# Patient Record
Sex: FEMALE | Race: WHITE | NOT HISPANIC OR LATINO | URBAN - METROPOLITAN AREA
[De-identification: names, ages, dates, MRNs, and addresses within clinical notes are randomized per-mention and may not be internally consistent; named-entity substitution may affect disease eponyms.]

---

## 2017-06-19 ENCOUNTER — INPATIENT (INPATIENT)
Facility: HOSPITAL | Age: 47
LOS: 3 days | Discharge: HOME HEALTH | End: 2017-06-23
Attending: OBSTETRICS & GYNECOLOGY | Admitting: OBSTETRICS & GYNECOLOGY
Payer: COMMERCIAL

## 2017-06-19 VITALS — WEIGHT: 129.19 LBS | HEIGHT: 63 IN

## 2017-06-19 DIAGNOSIS — O26.899 OTHER SPECIFIED PREGNANCY RELATED CONDITIONS, UNSPECIFIED TRIMESTER: ICD-10-CM

## 2017-06-19 DIAGNOSIS — Z3A.00 WEEKS OF GESTATION OF PREGNANCY NOT SPECIFIED: ICD-10-CM

## 2017-06-19 LAB
ALBUMIN SERPL ELPH-MCNC: 3.3 G/DL — SIGNIFICANT CHANGE UP (ref 3.3–5)
ALBUMIN SERPL ELPH-MCNC: 3.7 G/DL — SIGNIFICANT CHANGE UP (ref 3.3–5)
ALP SERPL-CCNC: 156 U/L — HIGH (ref 40–120)
ALP SERPL-CCNC: 169 U/L — HIGH (ref 40–120)
ALT FLD-CCNC: 34 U/L — SIGNIFICANT CHANGE UP (ref 10–45)
ALT FLD-CCNC: 40 U/L — SIGNIFICANT CHANGE UP (ref 10–45)
ANION GAP SERPL CALC-SCNC: 15 MMOL/L — SIGNIFICANT CHANGE UP (ref 5–17)
ANION GAP SERPL CALC-SCNC: 16 MMOL/L — SIGNIFICANT CHANGE UP (ref 5–17)
APPEARANCE UR: SIGNIFICANT CHANGE UP
APTT BLD: 26.6 SEC — LOW (ref 27.5–37.4)
APTT BLD: 26.8 SEC — LOW (ref 27.5–37.4)
AST SERPL-CCNC: 45 U/L — HIGH (ref 10–40)
AST SERPL-CCNC: 53 U/L — HIGH (ref 10–40)
BASOPHILS NFR BLD AUTO: 0.1 % — SIGNIFICANT CHANGE UP (ref 0–2)
BASOPHILS NFR BLD AUTO: 0.2 % — SIGNIFICANT CHANGE UP (ref 0–2)
BILIRUB SERPL-MCNC: 0.2 MG/DL — SIGNIFICANT CHANGE UP (ref 0.2–1.2)
BILIRUB SERPL-MCNC: 0.3 MG/DL — SIGNIFICANT CHANGE UP (ref 0.2–1.2)
BILIRUB UR-MCNC: NEGATIVE — SIGNIFICANT CHANGE UP
BLD GP AB SCN SERPL QL: NEGATIVE — SIGNIFICANT CHANGE UP
BUN SERPL-MCNC: 11 MG/DL — SIGNIFICANT CHANGE UP (ref 7–23)
BUN SERPL-MCNC: 16 MG/DL — SIGNIFICANT CHANGE UP (ref 7–23)
CALCIUM SERPL-MCNC: 9.5 MG/DL — SIGNIFICANT CHANGE UP (ref 8.4–10.5)
CALCIUM SERPL-MCNC: 9.7 MG/DL — SIGNIFICANT CHANGE UP (ref 8.4–10.5)
CHLORIDE SERPL-SCNC: 100 MMOL/L — SIGNIFICANT CHANGE UP (ref 96–108)
CHLORIDE SERPL-SCNC: 104 MMOL/L — SIGNIFICANT CHANGE UP (ref 96–108)
CO2 SERPL-SCNC: 19 MMOL/L — LOW (ref 22–31)
CO2 SERPL-SCNC: 22 MMOL/L — SIGNIFICANT CHANGE UP (ref 22–31)
COLOR SPEC: YELLOW — SIGNIFICANT CHANGE UP
CREAT SERPL-MCNC: 0.8 MG/DL — SIGNIFICANT CHANGE UP (ref 0.5–1.3)
CREAT SERPL-MCNC: 0.9 MG/DL — SIGNIFICANT CHANGE UP (ref 0.5–1.3)
DIFF PNL FLD: (no result)
EOSINOPHIL NFR BLD AUTO: 0.6 % — SIGNIFICANT CHANGE UP (ref 0–6)
FIBRINOGEN PPP-MCNC: 347 MG/DL — SIGNIFICANT CHANGE UP (ref 258–438)
FIBRINOGEN PPP-MCNC: 360 MG/DL — SIGNIFICANT CHANGE UP (ref 258–438)
GLUCOSE SERPL-MCNC: 81 MG/DL — SIGNIFICANT CHANGE UP (ref 70–99)
GLUCOSE SERPL-MCNC: 89 MG/DL — SIGNIFICANT CHANGE UP (ref 70–99)
GLUCOSE UR QL: NEGATIVE — SIGNIFICANT CHANGE UP
HCT VFR BLD CALC: 38.4 % — SIGNIFICANT CHANGE UP (ref 34.5–45)
HCT VFR BLD CALC: 44.5 % — SIGNIFICANT CHANGE UP (ref 34.5–45)
HGB BLD-MCNC: 13.6 G/DL — SIGNIFICANT CHANGE UP (ref 11.5–15.5)
HGB BLD-MCNC: 15.5 G/DL — SIGNIFICANT CHANGE UP (ref 11.5–15.5)
INR BLD: 0.82 — LOW (ref 0.88–1.16)
INR BLD: 0.86 — LOW (ref 0.88–1.16)
KETONES UR-MCNC: NEGATIVE — SIGNIFICANT CHANGE UP
LDH SERPL L TO P-CCNC: 279 U/L — HIGH (ref 50–242)
LDH SERPL L TO P-CCNC: 360 U/L — HIGH (ref 50–242)
LEUKOCYTE ESTERASE UR-ACNC: NEGATIVE — SIGNIFICANT CHANGE UP
LYMPHOCYTES # BLD AUTO: 29 % — SIGNIFICANT CHANGE UP (ref 13–44)
LYMPHOCYTES # BLD AUTO: 7.9 % — LOW (ref 13–44)
MCHC RBC-ENTMCNC: 30.6 PG — SIGNIFICANT CHANGE UP (ref 27–34)
MCHC RBC-ENTMCNC: 31.1 PG — SIGNIFICANT CHANGE UP (ref 27–34)
MCHC RBC-ENTMCNC: 34.8 G/DL — SIGNIFICANT CHANGE UP (ref 32–36)
MCHC RBC-ENTMCNC: 35.4 G/DL — SIGNIFICANT CHANGE UP (ref 32–36)
MCV RBC AUTO: 87.8 FL — SIGNIFICANT CHANGE UP (ref 80–100)
MCV RBC AUTO: 87.9 FL — SIGNIFICANT CHANGE UP (ref 80–100)
MONOCYTES NFR BLD AUTO: 1.6 % — LOW (ref 2–14)
MONOCYTES NFR BLD AUTO: 7.7 % — SIGNIFICANT CHANGE UP (ref 2–14)
NEUTROPHILS NFR BLD AUTO: 62.5 % — SIGNIFICANT CHANGE UP (ref 43–77)
NEUTROPHILS NFR BLD AUTO: 90.4 % — HIGH (ref 43–77)
NITRITE UR-MCNC: NEGATIVE — SIGNIFICANT CHANGE UP
PH UR: 6.5 — SIGNIFICANT CHANGE UP (ref 5–8)
PLATELET # BLD AUTO: 187 K/UL — SIGNIFICANT CHANGE UP (ref 150–400)
PLATELET # BLD AUTO: 195 K/UL — SIGNIFICANT CHANGE UP (ref 150–400)
POTASSIUM SERPL-MCNC: 4.2 MMOL/L — SIGNIFICANT CHANGE UP (ref 3.5–5.3)
POTASSIUM SERPL-MCNC: 4.8 MMOL/L — SIGNIFICANT CHANGE UP (ref 3.5–5.3)
POTASSIUM SERPL-SCNC: 4.2 MMOL/L — SIGNIFICANT CHANGE UP (ref 3.5–5.3)
POTASSIUM SERPL-SCNC: 4.8 MMOL/L — SIGNIFICANT CHANGE UP (ref 3.5–5.3)
PROT SERPL-MCNC: 6.7 G/DL — SIGNIFICANT CHANGE UP (ref 6–8.3)
PROT SERPL-MCNC: 7.4 G/DL — SIGNIFICANT CHANGE UP (ref 6–8.3)
PROT UR-MCNC: 30 MG/DL
PROTHROM AB SERPL-ACNC: 9.1 SEC — LOW (ref 9.8–12.7)
PROTHROM AB SERPL-ACNC: 9.5 SEC — LOW (ref 9.8–12.7)
RBC # BLD: 4.37 M/UL — SIGNIFICANT CHANGE UP (ref 3.8–5.2)
RBC # BLD: 5.07 M/UL — SIGNIFICANT CHANGE UP (ref 3.8–5.2)
RBC # FLD: 13.9 % — SIGNIFICANT CHANGE UP (ref 10.3–16.9)
RBC # FLD: 14 % — SIGNIFICANT CHANGE UP (ref 10.3–16.9)
RH IG SCN BLD-IMP: POSITIVE — SIGNIFICANT CHANGE UP
RH IG SCN BLD-IMP: POSITIVE — SIGNIFICANT CHANGE UP
SODIUM SERPL-SCNC: 134 MMOL/L — LOW (ref 135–145)
SODIUM SERPL-SCNC: 142 MMOL/L — SIGNIFICANT CHANGE UP (ref 135–145)
SP GR SPEC: <=1.005 — SIGNIFICANT CHANGE UP (ref 1–1.03)
T PALLIDUM AB TITR SER: NEGATIVE — SIGNIFICANT CHANGE UP
URATE SERPL-MCNC: 7 MG/DL — SIGNIFICANT CHANGE UP (ref 2.5–7)
URATE SERPL-MCNC: 7.5 MG/DL — HIGH (ref 2.5–7)
UROBILINOGEN FLD QL: 0.2 E.U./DL — SIGNIFICANT CHANGE UP
WBC # BLD: 12.4 K/UL — HIGH (ref 3.8–10.5)
WBC # BLD: 9.4 K/UL — SIGNIFICANT CHANGE UP (ref 3.8–10.5)
WBC # FLD AUTO: 12.4 K/UL — HIGH (ref 3.8–10.5)
WBC # FLD AUTO: 9.4 K/UL — SIGNIFICANT CHANGE UP (ref 3.8–10.5)

## 2017-06-19 RX ORDER — DOCUSATE SODIUM 100 MG
100 CAPSULE ORAL
Qty: 0 | Refills: 0 | Status: DISCONTINUED | OUTPATIENT
Start: 2017-06-19 | End: 2017-06-23

## 2017-06-19 RX ORDER — METOCLOPRAMIDE HCL 10 MG
10 TABLET ORAL ONCE
Qty: 0 | Refills: 0 | Status: DISCONTINUED | OUTPATIENT
Start: 2017-06-19 | End: 2017-06-19

## 2017-06-19 RX ORDER — FERROUS SULFATE 325(65) MG
325 TABLET ORAL DAILY
Qty: 0 | Refills: 0 | Status: DISCONTINUED | OUTPATIENT
Start: 2017-06-19 | End: 2017-06-23

## 2017-06-19 RX ORDER — NALOXONE HYDROCHLORIDE 4 MG/.1ML
0.1 SPRAY NASAL
Qty: 0 | Refills: 0 | Status: DISCONTINUED | OUTPATIENT
Start: 2017-06-19 | End: 2017-06-19

## 2017-06-19 RX ORDER — SODIUM CHLORIDE 9 MG/ML
1000 INJECTION, SOLUTION INTRAVENOUS
Qty: 0 | Refills: 0 | Status: DISCONTINUED | OUTPATIENT
Start: 2017-06-19 | End: 2017-06-19

## 2017-06-19 RX ORDER — TETANUS TOXOID, REDUCED DIPHTHERIA TOXOID AND ACELLULAR PERTUSSIS VACCINE, ADSORBED 5; 2.5; 8; 8; 2.5 [IU]/.5ML; [IU]/.5ML; UG/.5ML; UG/.5ML; UG/.5ML
0.5 SUSPENSION INTRAMUSCULAR ONCE
Qty: 0 | Refills: 0 | Status: DISCONTINUED | OUTPATIENT
Start: 2017-06-19 | End: 2017-06-23

## 2017-06-19 RX ORDER — HEPARIN SODIUM 5000 [USP'U]/ML
5000 INJECTION INTRAVENOUS; SUBCUTANEOUS EVERY 12 HOURS
Qty: 0 | Refills: 0 | Status: DISCONTINUED | OUTPATIENT
Start: 2017-06-19 | End: 2017-06-23

## 2017-06-19 RX ORDER — ACETAMINOPHEN 500 MG
650 TABLET ORAL EVERY 6 HOURS
Qty: 0 | Refills: 0 | Status: DISCONTINUED | OUTPATIENT
Start: 2017-06-19 | End: 2017-06-23

## 2017-06-19 RX ORDER — DIPHENHYDRAMINE HCL 50 MG
25 CAPSULE ORAL EVERY 6 HOURS
Qty: 0 | Refills: 0 | Status: DISCONTINUED | OUTPATIENT
Start: 2017-06-19 | End: 2017-06-23

## 2017-06-19 RX ORDER — SODIUM CHLORIDE 9 MG/ML
1000 INJECTION, SOLUTION INTRAVENOUS
Qty: 0 | Refills: 0 | Status: DISCONTINUED | OUTPATIENT
Start: 2017-06-19 | End: 2017-06-20

## 2017-06-19 RX ORDER — MAGNESIUM SULFATE 500 MG/ML
4 VIAL (ML) INJECTION ONCE
Qty: 0 | Refills: 0 | Status: COMPLETED | OUTPATIENT
Start: 2017-06-19 | End: 2017-06-19

## 2017-06-19 RX ORDER — CEFAZOLIN SODIUM 1 G
2000 VIAL (EA) INJECTION ONCE
Qty: 0 | Refills: 0 | Status: COMPLETED | OUTPATIENT
Start: 2017-06-19 | End: 2017-06-19

## 2017-06-19 RX ORDER — MAGNESIUM SULFATE 500 MG/ML
2 VIAL (ML) INJECTION
Qty: 40 | Refills: 0 | Status: DISCONTINUED | OUTPATIENT
Start: 2017-06-19 | End: 2017-06-20

## 2017-06-19 RX ORDER — LABETALOL HCL 100 MG
20 TABLET ORAL ONCE
Qty: 0 | Refills: 0 | Status: COMPLETED | OUTPATIENT
Start: 2017-06-19 | End: 2017-06-19

## 2017-06-19 RX ORDER — SIMETHICONE 80 MG/1
80 TABLET, CHEWABLE ORAL EVERY 4 HOURS
Qty: 0 | Refills: 0 | Status: DISCONTINUED | OUTPATIENT
Start: 2017-06-19 | End: 2017-06-23

## 2017-06-19 RX ORDER — GLYCERIN ADULT
1 SUPPOSITORY, RECTAL RECTAL AT BEDTIME
Qty: 0 | Refills: 0 | Status: DISCONTINUED | OUTPATIENT
Start: 2017-06-19 | End: 2017-06-23

## 2017-06-19 RX ORDER — OXYTOCIN 10 UNIT/ML
25 VIAL (ML) INJECTION
Qty: 20 | Refills: 0 | Status: DISCONTINUED | OUTPATIENT
Start: 2017-06-19 | End: 2017-06-23

## 2017-06-19 RX ORDER — LANOLIN
1 OINTMENT (GRAM) TOPICAL
Qty: 0 | Refills: 0 | Status: DISCONTINUED | OUTPATIENT
Start: 2017-06-19 | End: 2017-06-23

## 2017-06-19 RX ORDER — SODIUM CHLORIDE 9 MG/ML
1000 INJECTION, SOLUTION INTRAVENOUS ONCE
Qty: 0 | Refills: 0 | Status: COMPLETED | OUTPATIENT
Start: 2017-06-19 | End: 2017-06-19

## 2017-06-19 RX ORDER — ONDANSETRON 8 MG/1
4 TABLET, FILM COATED ORAL EVERY 6 HOURS
Qty: 0 | Refills: 0 | Status: DISCONTINUED | OUTPATIENT
Start: 2017-06-19 | End: 2017-06-19

## 2017-06-19 RX ORDER — CITRIC ACID/SODIUM CITRATE 300-500 MG
30 SOLUTION, ORAL ORAL ONCE
Qty: 0 | Refills: 0 | Status: COMPLETED | OUTPATIENT
Start: 2017-06-19 | End: 2017-06-19

## 2017-06-19 RX ORDER — IBUPROFEN 200 MG
600 TABLET ORAL EVERY 6 HOURS
Qty: 0 | Refills: 0 | Status: DISCONTINUED | OUTPATIENT
Start: 2017-06-19 | End: 2017-06-23

## 2017-06-19 RX ORDER — OXYTOCIN 10 UNIT/ML
41.67 VIAL (ML) INJECTION
Qty: 20 | Refills: 0 | Status: DISCONTINUED | OUTPATIENT
Start: 2017-06-19 | End: 2017-06-19

## 2017-06-19 RX ADMIN — SODIUM CHLORIDE 2000 MILLILITER(S): 9 INJECTION, SOLUTION INTRAVENOUS at 09:55

## 2017-06-19 RX ADMIN — Medication 20 MILLIGRAM(S): at 16:25

## 2017-06-19 RX ADMIN — Medication 300 GRAM(S): at 16:48

## 2017-06-19 RX ADMIN — Medication 50 GM/HR: at 17:05

## 2017-06-19 RX ADMIN — Medication 15 MILLILITER(S): at 11:54

## 2017-06-19 RX ADMIN — Medication 100 MILLIGRAM(S): at 10:50

## 2017-06-20 LAB
ALBUMIN SERPL ELPH-MCNC: 2.8 G/DL — LOW (ref 3.3–5)
ALP SERPL-CCNC: 126 U/L — HIGH (ref 40–120)
ALT FLD-CCNC: 31 U/L — SIGNIFICANT CHANGE UP (ref 10–45)
ANION GAP SERPL CALC-SCNC: 14 MMOL/L — SIGNIFICANT CHANGE UP (ref 5–17)
APTT BLD: 30.9 SEC — SIGNIFICANT CHANGE UP (ref 27.5–37.4)
AST SERPL-CCNC: 47 U/L — HIGH (ref 10–40)
BASOPHILS NFR BLD AUTO: 0.1 % — SIGNIFICANT CHANGE UP (ref 0–2)
BILIRUB SERPL-MCNC: 0.2 MG/DL — SIGNIFICANT CHANGE UP (ref 0.2–1.2)
BUN SERPL-MCNC: 8 MG/DL — SIGNIFICANT CHANGE UP (ref 7–23)
CALCIUM SERPL-MCNC: 7 MG/DL — LOW (ref 8.4–10.5)
CHLORIDE SERPL-SCNC: 95 MMOL/L — LOW (ref 96–108)
CO2 SERPL-SCNC: 23 MMOL/L — SIGNIFICANT CHANGE UP (ref 22–31)
CREAT SERPL-MCNC: 0.7 MG/DL — SIGNIFICANT CHANGE UP (ref 0.5–1.3)
EOSINOPHIL NFR BLD AUTO: 0.1 % — SIGNIFICANT CHANGE UP (ref 0–6)
FIBRINOGEN PPP-MCNC: 400 MG/DL — SIGNIFICANT CHANGE UP (ref 258–438)
GLUCOSE SERPL-MCNC: 78 MG/DL — SIGNIFICANT CHANGE UP (ref 70–99)
HCT VFR BLD CALC: 37.5 % — SIGNIFICANT CHANGE UP (ref 34.5–45)
HCT VFR BLD CALC: 38.9 % — SIGNIFICANT CHANGE UP (ref 34.5–45)
HGB BLD-MCNC: 13.4 G/DL — SIGNIFICANT CHANGE UP (ref 11.5–15.5)
HGB BLD-MCNC: 13.5 G/DL — SIGNIFICANT CHANGE UP (ref 11.5–15.5)
INR BLD: 0.81 — LOW (ref 0.88–1.16)
LDH SERPL L TO P-CCNC: 367 U/L — HIGH (ref 50–242)
LYMPHOCYTES # BLD AUTO: 16.8 % — SIGNIFICANT CHANGE UP (ref 13–44)
MAGNESIUM SERPL-MCNC: 6.5 MG/DL — HIGH (ref 1.6–2.6)
MAGNESIUM SERPL-MCNC: 6.7 MG/DL — HIGH (ref 1.6–2.6)
MAGNESIUM SERPL-MCNC: 7.2 MG/DL — CRITICAL HIGH (ref 1.6–2.6)
MCHC RBC-ENTMCNC: 30.2 PG — SIGNIFICANT CHANGE UP (ref 27–34)
MCHC RBC-ENTMCNC: 31 PG — SIGNIFICANT CHANGE UP (ref 27–34)
MCHC RBC-ENTMCNC: 34.7 G/DL — SIGNIFICANT CHANGE UP (ref 32–36)
MCHC RBC-ENTMCNC: 35.7 G/DL — SIGNIFICANT CHANGE UP (ref 32–36)
MCV RBC AUTO: 86.8 FL — SIGNIFICANT CHANGE UP (ref 80–100)
MCV RBC AUTO: 87 FL — SIGNIFICANT CHANGE UP (ref 80–100)
MONOCYTES NFR BLD AUTO: 8.5 % — SIGNIFICANT CHANGE UP (ref 2–14)
NEUTROPHILS NFR BLD AUTO: 74.5 % — SIGNIFICANT CHANGE UP (ref 43–77)
PLATELET # BLD AUTO: 178 K/UL — SIGNIFICANT CHANGE UP (ref 150–400)
PLATELET # BLD AUTO: 180 K/UL — SIGNIFICANT CHANGE UP (ref 150–400)
POTASSIUM SERPL-MCNC: 3.9 MMOL/L — SIGNIFICANT CHANGE UP (ref 3.5–5.3)
POTASSIUM SERPL-SCNC: 3.9 MMOL/L — SIGNIFICANT CHANGE UP (ref 3.5–5.3)
PROT SERPL-MCNC: 5.7 G/DL — LOW (ref 6–8.3)
PROTHROM AB SERPL-ACNC: 9 SEC — LOW (ref 9.8–12.7)
RBC # BLD: 4.32 M/UL — SIGNIFICANT CHANGE UP (ref 3.8–5.2)
RBC # BLD: 4.47 M/UL — SIGNIFICANT CHANGE UP (ref 3.8–5.2)
RBC # FLD: 13.7 % — SIGNIFICANT CHANGE UP (ref 10.3–16.9)
RBC # FLD: 13.9 % — SIGNIFICANT CHANGE UP (ref 10.3–16.9)
SODIUM SERPL-SCNC: 132 MMOL/L — LOW (ref 135–145)
URATE SERPL-MCNC: 6.6 MG/DL — SIGNIFICANT CHANGE UP (ref 2.5–7)
WBC # BLD: 16.2 K/UL — HIGH (ref 3.8–10.5)
WBC # BLD: 19.3 K/UL — HIGH (ref 3.8–10.5)
WBC # FLD AUTO: 16.2 K/UL — HIGH (ref 3.8–10.5)
WBC # FLD AUTO: 19.3 K/UL — HIGH (ref 3.8–10.5)

## 2017-06-20 RX ORDER — MAGNESIUM SULFATE 500 MG/ML
1 VIAL (ML) INJECTION
Qty: 40 | Refills: 0 | Status: DISCONTINUED | OUTPATIENT
Start: 2017-06-20 | End: 2017-06-23

## 2017-06-20 RX ORDER — SODIUM CHLORIDE 9 MG/ML
1000 INJECTION, SOLUTION INTRAVENOUS
Qty: 0 | Refills: 0 | Status: DISCONTINUED | OUTPATIENT
Start: 2017-06-20 | End: 2017-06-23

## 2017-06-20 RX ORDER — ACETAMINOPHEN 500 MG
650 TABLET ORAL EVERY 6 HOURS
Qty: 0 | Refills: 0 | Status: DISCONTINUED | OUTPATIENT
Start: 2017-06-20 | End: 2017-06-23

## 2017-06-20 RX ORDER — SODIUM CHLORIDE 9 MG/ML
1000 INJECTION, SOLUTION INTRAVENOUS
Qty: 0 | Refills: 0 | Status: DISCONTINUED | OUTPATIENT
Start: 2017-06-20 | End: 2017-06-20

## 2017-06-20 RX ORDER — MAGNESIUM SULFATE 500 MG/ML
1.5 VIAL (ML) INJECTION
Qty: 40 | Refills: 0 | Status: DISCONTINUED | OUTPATIENT
Start: 2017-06-20 | End: 2017-06-20

## 2017-06-20 RX ADMIN — HEPARIN SODIUM 5000 UNIT(S): 5000 INJECTION INTRAVENOUS; SUBCUTANEOUS at 12:30

## 2017-06-20 RX ADMIN — Medication 100 MILLIGRAM(S): at 09:26

## 2017-06-20 RX ADMIN — Medication 650 MILLIGRAM(S): at 17:07

## 2017-06-20 RX ADMIN — Medication 1 TABLET(S): at 09:26

## 2017-06-20 RX ADMIN — Medication 75 MILLIUNIT(S)/MIN: at 02:40

## 2017-06-20 RX ADMIN — Medication 650 MILLIGRAM(S): at 09:27

## 2017-06-20 RX ADMIN — Medication 325 MILLIGRAM(S): at 09:26

## 2017-06-20 RX ADMIN — HEPARIN SODIUM 5000 UNIT(S): 5000 INJECTION INTRAVENOUS; SUBCUTANEOUS at 00:08

## 2017-06-20 RX ADMIN — Medication 650 MILLIGRAM(S): at 17:45

## 2017-06-20 NOTE — PROGRESS NOTE ADULT - ASSESSMENT
A&P: 46y Female   s/p CS POD1  complicated by preeclampsia with severe features  - Preeclampsia: Continue IV Magnesium @2G/hr for 24 hrs post delivery. Magnesium clinical checks and serum assay q6 hrs. No complaints currently. BP well controlled.  - VTE: SCDs, SQH if indicated.   - GI: Diet: clears as tolerated if not nauseous  - Neuro: Oral pain medications as needed: hold NSAIDs  - : strict Is and Os, D/C card after discontinuation of IV Magnesium  - Discharge planning: discussed importance of BP check at home, will be evaluated by [facility] nursing re need for VNS. Discussed close follow up with OB within 1-2 wks. Reviewed toxic complaints with patient.

## 2017-06-20 NOTE — PROGRESS NOTE ADULT - ASSESSMENT
A&P: 46y Female   s/p primary CS for breech complicated by preeclampsia  1. Preeclampsia: Continue IV magnesium for 24 hrs at 2g/hr. Magnesium clinical checks and serum assay q6 hrs. No complaints currently. BP normotensive to mild range.  2. VTE: SCDs, SQH.   3. GI: Diet: clears as tolerated if not nauseous  4. Pain: Oral pain medications as needed: hold NSAIDs  5. : strict Is and Os, D/C card after discontinuation of IV Magnesium  6. Discharge planning: discussed importance of BP check at home, will be evaluated by PP nursing regarding need for VNS. Discussed close follow up with OB within 1-2 wks. Reviewed toxic complaints with patient.

## 2017-06-21 RX ADMIN — Medication 325 MILLIGRAM(S): at 08:39

## 2017-06-21 RX ADMIN — Medication 100 MILLIGRAM(S): at 17:42

## 2017-06-21 RX ADMIN — Medication 600 MILLIGRAM(S): at 18:30

## 2017-06-21 RX ADMIN — Medication 100 MILLIGRAM(S): at 00:41

## 2017-06-21 RX ADMIN — Medication 1 TABLET(S): at 08:38

## 2017-06-21 RX ADMIN — Medication 600 MILLIGRAM(S): at 09:35

## 2017-06-21 RX ADMIN — Medication 650 MILLIGRAM(S): at 00:41

## 2017-06-21 RX ADMIN — HEPARIN SODIUM 5000 UNIT(S): 5000 INJECTION INTRAVENOUS; SUBCUTANEOUS at 00:05

## 2017-06-21 RX ADMIN — Medication 650 MILLIGRAM(S): at 01:38

## 2017-06-21 RX ADMIN — Medication 600 MILLIGRAM(S): at 08:39

## 2017-06-21 RX ADMIN — Medication 600 MILLIGRAM(S): at 17:43

## 2017-06-21 RX ADMIN — HEPARIN SODIUM 5000 UNIT(S): 5000 INJECTION INTRAVENOUS; SUBCUTANEOUS at 11:32

## 2017-06-21 NOTE — PROGRESS NOTE ADULT - ASSESSMENT
A/P   46y  s/p c/s, POD #2, c/b PEC w/severe features s/p IV Mg, currently stable, normotensive and asymptomatic  -  Pain: PO motrin, percocet  -  GI: reg diet  -  : s/p card, voiding spontaneously  -  DVT prophylaxis: ambulation, SCDs, SQH  -  Dispo: POD 3 or 4

## 2017-06-22 RX ADMIN — Medication 100 MILLIGRAM(S): at 09:50

## 2017-06-22 RX ADMIN — Medication 100 MILLIGRAM(S): at 23:37

## 2017-06-22 RX ADMIN — HEPARIN SODIUM 5000 UNIT(S): 5000 INJECTION INTRAVENOUS; SUBCUTANEOUS at 00:10

## 2017-06-22 RX ADMIN — Medication 600 MILLIGRAM(S): at 23:37

## 2017-06-22 RX ADMIN — Medication 600 MILLIGRAM(S): at 00:51

## 2017-06-22 RX ADMIN — Medication 600 MILLIGRAM(S): at 14:10

## 2017-06-22 RX ADMIN — Medication 600 MILLIGRAM(S): at 13:48

## 2017-06-22 RX ADMIN — Medication 325 MILLIGRAM(S): at 09:50

## 2017-06-22 RX ADMIN — Medication 600 MILLIGRAM(S): at 06:35

## 2017-06-22 RX ADMIN — HEPARIN SODIUM 5000 UNIT(S): 5000 INJECTION INTRAVENOUS; SUBCUTANEOUS at 11:48

## 2017-06-22 RX ADMIN — Medication 600 MILLIGRAM(S): at 00:06

## 2017-06-22 RX ADMIN — Medication 1 TABLET(S): at 09:51

## 2017-06-22 RX ADMIN — Medication 600 MILLIGRAM(S): at 07:20

## 2017-06-22 NOTE — PROGRESS NOTE ADULT - ASSESSMENT
A/P   46y  s/p c/s, POD #3, stable  - Shortness of breath: pulmonary exam benign, O2 saturation 100%.  Encouraged incentive spirometry and ambulation; will follow up today, CXR/EKG if continues  -  Pain: PO motrin, percocet  -  GI: reg diet  -  : s/p card, voiding spontaneously  -  DVT prophylaxis: ambulation, SCDs, SQH  -  Dispo: POD 3 or 4

## 2017-06-22 NOTE — DISCHARGE NOTE OB - CARE PROVIDER_API CALL
Natalya Davis (), Obstetrics and Gynecology  [address] [place] [address], [place] [address]  Phone: [phone]  Fax: [phone]

## 2017-06-22 NOTE — DISCHARGE NOTE OB - PLAN OF CARE
Full recovery Rest a lot. Walk as much as comfortable. Pre-eclampsia precautions - headaches, blurry vision, nausea.

## 2017-06-22 NOTE — DISCHARGE NOTE OB - PATIENT PORTAL LINK FT
“You can access the [employer] Patient Portal, offered by [facility], by registering with the following website: [email or web address]”

## 2017-06-23 VITALS
TEMPERATURE: 98 F | SYSTOLIC BLOOD PRESSURE: 122 MMHG | RESPIRATION RATE: 20 BRPM | OXYGEN SATURATION: 99 % | DIASTOLIC BLOOD PRESSURE: 70 MMHG | HEART RATE: 64 BPM

## 2017-06-23 PROCEDURE — 85610 PROTHROMBIN TIME: CPT

## 2017-06-23 PROCEDURE — 85384 FIBRINOGEN ACTIVITY: CPT

## 2017-06-23 PROCEDURE — 86900 BLOOD TYPING SEROLOGIC ABO: CPT

## 2017-06-23 PROCEDURE — 85730 THROMBOPLASTIN TIME PARTIAL: CPT

## 2017-06-23 PROCEDURE — 88307 TISSUE EXAM BY PATHOLOGIST: CPT

## 2017-06-23 PROCEDURE — 83735 ASSAY OF MAGNESIUM: CPT

## 2017-06-23 PROCEDURE — 83615 LACTATE (LD) (LDH) ENZYME: CPT

## 2017-06-23 PROCEDURE — 86901 BLOOD TYPING SEROLOGIC RH(D): CPT

## 2017-06-23 PROCEDURE — 85025 COMPLETE CBC W/AUTO DIFF WBC: CPT

## 2017-06-23 PROCEDURE — 85027 COMPLETE CBC AUTOMATED: CPT

## 2017-06-23 PROCEDURE — 99214 OFFICE O/P EST MOD 30 MIN: CPT

## 2017-06-23 PROCEDURE — 81001 URINALYSIS AUTO W/SCOPE: CPT

## 2017-06-23 PROCEDURE — 84550 ASSAY OF BLOOD/URIC ACID: CPT

## 2017-06-23 PROCEDURE — 86850 RBC ANTIBODY SCREEN: CPT

## 2017-06-23 PROCEDURE — 36415 COLL VENOUS BLD VENIPUNCTURE: CPT

## 2017-06-23 PROCEDURE — 86780 TREPONEMA PALLIDUM: CPT

## 2017-06-23 PROCEDURE — 80053 COMPREHEN METABOLIC PANEL: CPT

## 2017-06-23 RX ADMIN — Medication 600 MILLIGRAM(S): at 00:34

## 2017-06-23 RX ADMIN — HEPARIN SODIUM 5000 UNIT(S): 5000 INJECTION INTRAVENOUS; SUBCUTANEOUS at 00:10

## 2017-06-23 NOTE — PROGRESS NOTE ADULT - ASSESSMENT
A/P   46y  s/p c/s, POD #4 c/b PEC s/p IV Mg, stable  -  Pain: PO motrin, percocet  -  GI: reg diet  -  : s/p card, voiding spontaneously  -  DVT prophylaxis: ambulation, SCDs, SQH  -  Dispo: POD 3 or 4

## 2017-06-23 NOTE — PROGRESS NOTE ADULT - SUBJECTIVE AND OBJECTIVE BOX
Patient evaluated at bedside.   She reports pain is well controlled with PO tylenol.  Denies headache/blurry vision RUQ pain.    She denies nausea, vomiting or heavy vaginal bleeding.  She has been ambulating without assistance, voiding spontaneously, passing gas, tolerating regular diet and is breastfeeding.    Physical Exam:  Vital Signs Last 24 Hrs  T(C): 36.7, Max: 36.8 (06-21 @ 02:03)  T(F): 98, Max: 98.2 (06-21 @ 02:03)  HR: 60 (59 - 97)  BP: 137/83 (123/79 - 137/83)  BP(mean): --  RR: 16 (16 - 17)  SpO2: 97% (97% - 99%)    GA: NAD, A+0 x 3  Abd: + BS, soft, nontender, nondistended, no rebound or guarding, incision clean, dry and intact, pressure dressing removed, steristrips in place, uterus firm at midline, 3 fb below umbilicus  : lochia WNL  Extremities: no swelling or calf tenderness                            13.4   16.2  )-----------( 178      ( 20 Jun 2017 13:47 )             37.5     06-20    132<L>  |  95<L>  |  8   ----------------------------<  78  3.9   |  23  |  0.70    Ca    7.0<L>      20 Jun 2017 13:47  Mg     6.5     06-20    TPro  5.7<L>  /  Alb  2.8<L>  /  TBili  0.2  /  DBili  x   /  AST  47<H>  /  ALT  31  /  AlkPhos  126<H>  06-20      PT/INR - ( 20 Jun 2017 13:47 )   PT: 9.0 sec;   INR: 0.81          PTT - ( 20 Jun 2017 13:47 )  PTT:30.9 sec
Patient evaluated at bedside.   She reports pain is well controlled with PO motrin.  Shortness of breath significantly improved after increased ambulation yesterday.  Feels she has full lung capacity restored.  She denies nausea, vomiting or heavy vaginal bleeding.  She has been ambulating without assistance, voiding spontaneously, passing gas, tolerating regular diet and is breastfeeding.    Physical Exam:  Vital Signs Last 24 Hrs  T(C): 36.7, Max: 37.2 (06-22 @ 23:42)  T(F): 98.1, Max: 99 (06-22 @ 23:42)  HR: 67 (64 - 80)  BP: 117/69 (117/69 - 140/83)  BP(mean): --  RR: 17 (16 - 18)  SpO2: 99% (98% - 99%)    GA: NAD, A+0 x 3  Abd: + BS, soft, nontender, nondistended, no rebound or guarding, incision clean, dry and intact, pressure dressing removed, steristrips in place, uterus firm at midline, 3 fb below umbilicus  : lochia WNL  Extremities: no swelling or calf tenderness
Patient evaluated at bedside. No acute events overnight, however complains of inability to take full breaths comfortably at this time.  She feels out of breath intermittently; has not been using incentive spirometer due to this discomfort.  Feels some dryness in throat; denies any cough/chest pain/palpitations.  She reports pain is well controlled with PO medication.  Denies headache/blurry vision/RUQ pain.     She denies nausea, vomiting or heavy vaginal bleeding.  She has been ambulating without assistance, voiding spontaneously, passing gas, tolerating regular diet and is breastfeeding.    Physical Exam:  Vital Signs Last 24 Hrs  T(C): 36.7, Max: 37.1 (06-21 @ 22:28)  T(F): 98, Max: 98.7 (06-21 @ 22:28)  HR: 64 (62 - 77)  BP: 131/75 (126/83 - 145/84)  BP(mean): --  RR: 16 (16 - 18)  SpO2: 100% (97% - 100%)    GA: NAD, A+0 x 3  Pulm: lungs CTAB, good air entry to both bases, no adventitious sounds  Abd: + BS, soft, nontender, nondistended, no rebound or guarding, incision clean, dry and intact with steristrips in place, uterus firm at midline below umbilicus  : lochia WNL  Extremities: no swelling or calf tenderness                            13.4   16.2  )-----------( 178      ( 20 Jun 2017 13:47 )             37.5     06-20    132<L>  |  95<L>  |  8   ----------------------------<  78  3.9   |  23  |  0.70    Ca    7.0<L>      20 Jun 2017 13:47  Mg     6.5     06-20    TPro  5.7<L>  /  Alb  2.8<L>  /  TBili  0.2  /  DBili  x   /  AST  47<H>  /  ALT  31  /  AlkPhos  126<H>  06-20      PT/INR - ( 20 Jun 2017 13:47 )   PT: 9.0 sec;   INR: 0.81          PTT - ( 20 Jun 2017 13:47 )  PTT:30.9 sec
Patient is doing very well. Eating breakfast. Pain is controlled. Incision looks good.     Encouraged more ambulation today.
Pt seen at bedside for severe range blood pressures x3 readings, all >160/80.  She reports that she is feeling well at this time, starting to regain some sensation in legs R>L.  Pain well controlled s/p spinal and IV tylenol.  Denies headache/blurry vision/chest pain/epigastric discomfort/RUQ pain/shortness of breath.  Did feel mild nausea immediately post-op but has since improved.  Physical exam unremarkable, abd soft/nontender with pressure dressing in place, no rebound or guarding, fundus firm.  Lungs CTAB, reflexes +2.      20 mg Labetalol IVP administered at 16:20, appropriate response with repeat measurement 147/64.  Full labs sent.  As pt now meets criteria for PEC with severe features, will initiate seizure prophylaxis with IV Mg at this time.  Continue clinical Mg checks and repeat labs q6 hours.      All above reviewed with attending.
S: Pt evaluated at bedside for magnesium check and AM rounds. No acute events overnight.  She denies visual disturbances, headache and right upper quadrant pain. Also denies nausea/vomiting/epigastric pain/shortness of breath. Pain well controlled.  Has her card in situ and has not yet voided, ambulated, or passed gas.  Tolerating clear fluids without any nausea/vomiting.          O:  T(C): 36.7, Max: 37.1 ( @ 20:34)  HR: 58 (58 - 78)  BP: 124/75 (111/69 - 126/60)  RR: 18 (16 - 18)  SpO2: 98% (97% - 99%)  Wt(kg): --  Daily Height in cm: 160.02 (2017 10:14)    Daily Weight Pre-pregnancy in k (2017 10:14)    I & Os for current day (as of  @ 06:41)  =============================================  IN: 2675 ml / OUT: 4165 ml / NET: -1490 ml    Gen: NAD, AAOx3  CV: RRR, no M/R/G  Pulm: CTAB, no R/R/W  Abd: soft, nontender, no rebound or guarding, no epigastric tenderness, liver nonpalpable +BS.   : Card   Ext: +1 edema dar, SCDs in place, Reflexes +2                          13.5   19.3  )-----------( 180      ( 2017 06:07 )             38.9         142  |  104  |  11  ----------------------------<  89  4.8   |  22  |  0.80    Ca    9.7      2017 16:43  Mg     6.7         TPro  7.4  /  Alb  3.7  /  TBili  0.3  /  DBili  x   /  AST  53<H>  /  ALT  40  /  AlkPhos  169<H>  
S: Pt evaluated at bedside for magnesium check. She denies visual disturbances, headache and epigastric or right upper quadrant pain. Also denies nausea/vomiting/shortness of breath. Pain well controlled.     O:  T(C): 36.8, Max: 37.1 ( @ 20:34)  HR: 65 (65 - 78)  BP: 111/69 (111/69 - 136/64)  RR: 17 (16 - 18)  SpO2: 98% (96% - 99%)  Wt(kg): --  Daily Height in cm: 160.02 (2017 10:14)    Daily Weight Pre-pregnancy in k (2017 10:14)    I & Os for current day (as of  @ 04:40)  =============================================  IN: 2675 ml / OUT: 4165 ml / NET: -1490 ml      Gen: NAD, AAOx3  CV: RRR, no M/R/G  Pulm: CTAB, no R/R/W  Abd: soft, nontender, no rebound or guarding, no epigastric tenderness, liver nonpalpable +BS.   : Kim   Ext: +1 edema dar, SCDs in place, Reflexes 2+    acetaminophen   Tablet 650milliGRAM(s) Oral every 6 hours PRN  ibuprofen  Tablet 600milliGRAM(s) Oral every 6 hours PRN  oxyCODONE  5 mG/acetaminophen 325 mG 1Tablet(s) Oral every 3 hours PRN  oxyCODONE  5 mG/acetaminophen 325 mG 2Tablet(s) Oral every 6 hours PRN  simethicone 80milliGRAM(s) Chew every 4 hours PRN  diphenhydrAMINE   Capsule 25milliGRAM(s) Oral every 6 hours PRN  diphtheria/tetanus/pertussis (acellular) Vaccine (ADAcel) 0.5milliLiter(s) IntraMuscular once  glycerin Suppository - Adult 1Suppository(s) Rectal at bedtime PRN  docusate sodium 100milliGRAM(s) Oral two times a day PRN  lanolin Ointment 1Application(s) Topical every 3 hours PRN  ferrous    sulfate 325milliGRAM(s) Oral daily  prenatal multivitamin 1Tablet(s) Oral daily  heparin  Injectable 5000Unit(s) SubCutaneous every 12 hours  oxytocin Infusion 25milliUNIT(s)/Min IV Continuous <Continuous>  magnesium sulfate Infusion 1.5Gm/Hr IV Continuous <Continuous>  lactated ringers. 1000milliLiter(s) IV Continuous <Continuous>    sulfa drugs (Hives)                            15.5   12.4  )-----------( 187      ( 2017 16:43 )             44.5         142  |  104  |  11  ----------------------------<  89  4.8   |  22  |  0.80    Ca    9.7      2017 16:43  Mg     6.7         TPro  7.4  /  Alb  3.7  /  TBili  0.3  /  DBili  x   /  AST  53<H>  /  ALT  40  /  AlkPhos  169<H>

## 2017-06-27 DIAGNOSIS — Z34.83 ENCOUNTER FOR SUPERVISION OF OTHER NORMAL PREGNANCY, THIRD TRIMESTER: ICD-10-CM

## 2017-06-27 DIAGNOSIS — Z3A.37 37 WEEKS GESTATION OF PREGNANCY: ICD-10-CM

## 2017-06-27 LAB — SURGICAL PATHOLOGY STUDY: SIGNIFICANT CHANGE UP

## 2023-11-08 NOTE — DISCHARGE NOTE OB - CARE PLAN
faxed   Principal Discharge DX:	 delivery delivered  Goal:	Full recovery  Instructions for follow-up, activity and diet:	Rest a lot. Walk as much as comfortable. Pre-eclampsia precautions - headaches, blurry vision, nausea.

## 2023-12-27 NOTE — PATIENT PROFILE OB - PRO PARENT CONCERN YN OB
Subjective: Caregiver states pt has been doing ok  Falls since last visit No(if yes complete the Fall Tracking Form and include bsrifallreport):   Caregiver involvement changes: Son is primary caregiver   Home health supplies by type and quantity ordered/delivered this visit include: none     Clinician asked if patient has had any physician contact since last home care visit and patient states: NO  Clinician asked if patient has any new or changed medications and patient states:  NO   If Yes, were medications reconciled? NO   Was the certifying physician notified of changes in medications? NO     Clinical assessment (what this visit means for the patient overall and need for ongoing skilled care) and progress or lack of progress towards SPECIFIC goals: Pt continues to require SN for wound care     Written Teaching Material Utilized: N/A    Interdisciplinary communication with: N/A for the purpose of NA     Discharge planning as follows: When wound is 100% healed    Specific plan for next visit: Wound care
no